# Patient Record
Sex: FEMALE | Race: WHITE | Employment: FULL TIME | URBAN - METROPOLITAN AREA
[De-identification: names, ages, dates, MRNs, and addresses within clinical notes are randomized per-mention and may not be internally consistent; named-entity substitution may affect disease eponyms.]

---

## 2017-09-01 PROBLEM — Z34.93 NORMAL PREGNANCY IN THIRD TRIMESTER: Status: ACTIVE | Noted: 2017-09-01

## 2022-07-29 ENCOUNTER — APPOINTMENT (OUTPATIENT)
Dept: GENERAL RADIOLOGY | Age: 27
End: 2022-07-29
Payer: COMMERCIAL

## 2022-07-29 ENCOUNTER — HOSPITAL ENCOUNTER (EMERGENCY)
Age: 27
Discharge: HOME | End: 2022-07-29
Attending: EMERGENCY MEDICINE
Payer: COMMERCIAL

## 2022-07-29 VITALS
RESPIRATION RATE: 14 BRPM | SYSTOLIC BLOOD PRESSURE: 116 MMHG | WEIGHT: 145 LBS | DIASTOLIC BLOOD PRESSURE: 74 MMHG | HEIGHT: 66 IN | OXYGEN SATURATION: 100 % | HEART RATE: 75 BPM | TEMPERATURE: 98.1 F | BODY MASS INDEX: 23.3 KG/M2

## 2022-07-29 DIAGNOSIS — M25.561 ACUTE PAIN OF RIGHT KNEE: Primary | ICD-10-CM

## 2022-07-29 PROCEDURE — 73560 X-RAY EXAM OF KNEE 1 OR 2: CPT

## 2022-07-29 PROCEDURE — 99283 EMERGENCY DEPT VISIT LOW MDM: CPT

## 2022-07-29 ASSESSMENT — PAIN DESCRIPTION - PAIN TYPE: TYPE: ACUTE PAIN

## 2022-07-29 ASSESSMENT — PAIN DESCRIPTION - DESCRIPTORS: DESCRIPTORS: ACHING

## 2022-07-29 ASSESSMENT — PAIN DESCRIPTION - LOCATION: LOCATION: KNEE

## 2022-07-29 ASSESSMENT — PAIN DESCRIPTION - ORIENTATION: ORIENTATION: RIGHT

## 2022-07-29 ASSESSMENT — PAIN DESCRIPTION - FREQUENCY: FREQUENCY: CONTINUOUS

## 2022-07-29 ASSESSMENT — PAIN SCALES - GENERAL: PAINLEVEL_OUTOF10: 4

## 2022-07-29 ASSESSMENT — PAIN - FUNCTIONAL ASSESSMENT
PAIN_FUNCTIONAL_ASSESSMENT: NONE - DENIES PAIN
PAIN_FUNCTIONAL_ASSESSMENT: 0-10

## 2022-07-29 NOTE — ED NOTES
Patient given prescription, work note, discharge instructions verbal and written, patient verbalized understanding. Alert/oriented X4, Clear speech. Patient exhibits no distress, ambulates with steady gait per self leaving unit, no further request. Right knee ace wrap in place.      Sosa Black RN  07/29/22 7596

## 2022-07-29 NOTE — ED PROVIDER NOTES
extremities to command. LABS and DIAGNOSTIC RESULTS      RADIOLOGY  X-RAYS:  I have reviewed radiologic plain film image(s). ALL OTHER NON-PLAIN FILM IMAGES SUCH AS CT, ULTRASOUND AND MRI HAVE BEEN READ BY THE RADIOLOGIST. XR KNEE RIGHT (1-2 VIEWS)   Final Result      No acute bony pathology           LABS  Labs Reviewed - No data to display    Rakesh          I advised the patient to return to the emergency department immediately for any new or worsening symptoms, such as fever, migrating joint swelling, severe pain. The patient voiced agreement and understanding of the treatment plan. Patient was given scripts for the following medications. I counseled patient how to take these medications. Discharge Medication List as of 7/29/2022  6:34 PM        START taking these medications    Details   diclofenac sodium (VOLTAREN) 1 % GEL Apply 4 g topically in the morning and 4 g at noon and 4 g in the evening and 4 g before bedtime. , Topical, 4 TIMES DAILY Starting Fri 7/29/2022, Disp-100 g, R-0, Normal             Disposition  Pt is in good condition upon Discharge to home. Clinical Impression  1.  Acute pain of right knee           Dr. Farida Palmer, PGY3    Patient seen in collaboration with attending physician Dr. Michael Costa, DO  Resident  07/29/22 6711

## 2022-08-11 ENCOUNTER — HOSPITAL ENCOUNTER (EMERGENCY)
Age: 27
Discharge: HOME | End: 2022-08-11
Attending: STUDENT IN AN ORGANIZED HEALTH CARE EDUCATION/TRAINING PROGRAM
Payer: COMMERCIAL

## 2022-08-11 ENCOUNTER — APPOINTMENT (OUTPATIENT)
Dept: GENERAL RADIOLOGY | Age: 27
End: 2022-08-11
Payer: COMMERCIAL

## 2022-08-11 VITALS
WEIGHT: 146.4 LBS | HEIGHT: 66 IN | SYSTOLIC BLOOD PRESSURE: 114 MMHG | OXYGEN SATURATION: 95 % | TEMPERATURE: 98.5 F | BODY MASS INDEX: 23.53 KG/M2 | DIASTOLIC BLOOD PRESSURE: 72 MMHG | RESPIRATION RATE: 12 BRPM | HEART RATE: 75 BPM

## 2022-08-11 DIAGNOSIS — S62.325A CLOSED DISPLACED FRACTURE OF SHAFT OF FOURTH METACARPAL BONE OF LEFT HAND, INITIAL ENCOUNTER: Primary | ICD-10-CM

## 2022-08-11 PROCEDURE — 6370000000 HC RX 637 (ALT 250 FOR IP): Performed by: STUDENT IN AN ORGANIZED HEALTH CARE EDUCATION/TRAINING PROGRAM

## 2022-08-11 PROCEDURE — 99283 EMERGENCY DEPT VISIT LOW MDM: CPT

## 2022-08-11 PROCEDURE — 73130 X-RAY EXAM OF HAND: CPT

## 2022-08-11 PROCEDURE — 29125 APPL SHORT ARM SPLINT STATIC: CPT

## 2022-08-11 RX ORDER — IBUPROFEN 600 MG/1
600 TABLET ORAL 4 TIMES DAILY PRN
Qty: 40 TABLET | Refills: 0 | Status: SHIPPED | OUTPATIENT
Start: 2022-08-11

## 2022-08-11 RX ORDER — NAPROXEN 500 MG/1
500 TABLET ORAL 2 TIMES DAILY WITH MEALS
Status: DISCONTINUED | OUTPATIENT
Start: 2022-08-12 | End: 2022-08-12 | Stop reason: HOSPADM

## 2022-08-11 RX ORDER — ACETAMINOPHEN 500 MG
1000 TABLET ORAL ONCE
Status: COMPLETED | OUTPATIENT
Start: 2022-08-11 | End: 2022-08-11

## 2022-08-11 RX ORDER — ACETAMINOPHEN 500 MG
1000 TABLET ORAL 3 TIMES DAILY PRN
Qty: 180 TABLET | Refills: 0 | Status: SHIPPED | OUTPATIENT
Start: 2022-08-11

## 2022-08-11 RX ADMIN — ACETAMINOPHEN 1000 MG: 500 TABLET ORAL at 22:00

## 2022-08-11 RX ADMIN — Medication 500 MG: at 22:00

## 2022-08-11 ASSESSMENT — ENCOUNTER SYMPTOMS
DIARRHEA: 0
COUGH: 0
SHORTNESS OF BREATH: 0
VOMITING: 0
BACK PAIN: 0
NAUSEA: 0
SORE THROAT: 0
ABDOMINAL PAIN: 0
EYE PAIN: 0

## 2022-08-11 ASSESSMENT — PAIN DESCRIPTION - PAIN TYPE: TYPE: ACUTE PAIN

## 2022-08-11 ASSESSMENT — PAIN DESCRIPTION - DESCRIPTORS
DESCRIPTORS: ACHING
DESCRIPTORS: ACHING

## 2022-08-11 ASSESSMENT — PAIN DESCRIPTION - FREQUENCY: FREQUENCY: CONTINUOUS

## 2022-08-11 ASSESSMENT — PAIN DESCRIPTION - LOCATION: LOCATION: HAND;FINGER (COMMENT WHICH ONE)

## 2022-08-11 ASSESSMENT — PAIN - FUNCTIONAL ASSESSMENT: PAIN_FUNCTIONAL_ASSESSMENT: 0-10

## 2022-08-11 ASSESSMENT — PAIN SCALES - GENERAL
PAINLEVEL_OUTOF10: 8
PAINLEVEL_OUTOF10: 7

## 2022-08-11 ASSESSMENT — PAIN DESCRIPTION - ORIENTATION: ORIENTATION: LEFT

## 2022-08-11 NOTE — Clinical Note
Alicia Funk was seen and treated in our emergency department on 8/11/2022. She may return to work on 08/15/2022. If you have any questions or concerns, please don't hesitate to call.       Megan Lock MD

## 2022-08-12 NOTE — ED PROVIDER NOTES
[address]      Pt Name: Cassia Mccollum  MRN: [id number]  [place] 1995  Date of evaluation: 8/11/2022  Provider: Mikey Snyder MD    CHIEF COMPLAINT       Chief Complaint   Patient presents with    Hand Injury     Left hand pain. HISTORY OF PRESENT ILLNESS   (Location/Symptom, Timing/Onset,Context/Setting, Quality, Duration, Modifying Factors, Severity)  Note limiting factors. Cassia Mccollum is a 32 y.o. female who presents to the ED with a chief complaint of left hand pain after she jumped into a pool just prior to arrival.  Onset immediately after jumping in the pool, that she struck her head against something next to the pool. Pain localized over the metacarpal bones of each finger excluding the thumb on the left hand. Is able to range each finger completely but does have pain across the metacarpal phalangeal joints of the second and third digits. Denies sensory loss, focal weakness. No open wounds. No head trauma, no LOC, no neck pain. Symptoms not otherwise alleviated or exacerbated by other factors      NursingNotes were reviewed. REVIEW OF SYSTEMS    (2-9 systems for level 4, 10 or more for level 5)     Review of Systems   Constitutional:  Negative for chills and fever. HENT:  Negative for congestion and sore throat. Eyes:  Negative for pain and visual disturbance. Respiratory:  Negative for cough and shortness of breath. Cardiovascular:  Negative for chest pain and palpitations. Gastrointestinal:  Negative for abdominal pain, diarrhea, nausea and vomiting. Genitourinary:  Negative for dysuria and frequency. Musculoskeletal:  Positive for arthralgias and myalgias. Negative for back pain and neck pain. Left hand pain as in HPI. Skin:  Negative for rash and wound. Neurological:  Negative for dizziness, weakness and light-headedness.       PAST MEDICAL HISTORY     Past Medical History:   Diagnosis Date    Asthma Bipolar 1 disorder (HCC)     Heart abnormality     Pneumonia     Postpartum depression          SURGICALHISTORY       Past Surgical History:   Procedure Laterality Date    WISDOM TOOTH EXTRACTION  2015         CURRENT MEDICATIONS       Previous Medications    DICLOFENAC SODIUM (VOLTAREN) 1 % GEL    Apply 4 g topically in the morning and 4 g at noon and 4 g in the evening and 4 g before bedtime. IBUPROFEN (ADVIL;MOTRIN) 800 MG TABLET    Take 1 tablet by mouth every 8 hours    PRENATAL VIT-FE FUMARATE-FA (PRENATAL VITAMIN PO)    Take by mouth       ALLERGIES     Patient has no known allergies.     FAMILY HISTORY       Family History   Problem Relation Age of Onset    Depression Mother     Heart Disease Mother     High Blood Pressure Mother     Substance Abuse Mother     Mental Illness Father     Heart Disease Father     High Blood Pressure Father     Substance Abuse Father     Learning Disabilities Sister     Cancer Paternal Uncle     Heart Disease Maternal Grandmother     High Blood Pressure Maternal Grandmother     Heart Disease Maternal Grandfather     High Blood Pressure Maternal Grandfather     Cancer Paternal Grandmother     Heart Disease Paternal Grandmother     High Blood Pressure Paternal Grandmother     Heart Disease Paternal Grandfather     High Blood Pressure Paternal Grandfather           SOCIAL HISTORY       Social History     Socioeconomic History    Marital status: Single    Number of children: 2   Tobacco Use    Smoking status: Former     Packs/day: 1.00     Years: 4.00     Pack years: 4.00     Types: Cigarettes    Smokeless tobacco: Never   Vaping Use    Vaping Use: Never used   Substance and Sexual Activity    Alcohol use: Yes     Comment: social    Drug use: Not Currently     Types: Marijuana [place])     Comment: last smoked 3 weeks ago 12/20/15    Sexual activity: Yes     Partners: Male       SCREENINGS    [place] Coma Scale  Eye Opening: Spontaneous  Best Verbal Response: Oriented  Best Motor Response: Obeys commands  [place] Coma Scale Score: 15        PHYSICAL EXAM    (up to 7 for level 4, 8 or more for level 5)     ED Triage Vitals [08/11/22 2118]   BP Temp Temp Source Heart Rate Resp SpO2 Height Weight   114/72 98.5 °F (36.9 °C) Oral 75 12 95 % 5' 6\" (1.676 m) 146 lb 6.4 oz (66.4 kg)       General: Alert and oriented appropriately for age, No acute distress. Eye: Normal conjunctiva. Sclera anicteric. HENT: Oral mucosa is moist.  Respiratory: Respirations even and non-labored. Cardiovascular: Normal rate, Regular rhythm. Radial pulse 2+ equal to contralateral extremity. Gastrointestinal: Soft, Non-tender, Non-distended. : deferred. Physical Exam  Musculoskeletal:      Left elbow: Normal.      Left forearm: Normal.      Left wrist: Normal. No swelling, deformity, lacerations, bony tenderness or snuff box tenderness. Normal range of motion. Left hand: Swelling and bony tenderness (Second third fourth and fifth metacarpals) present. No deformity or lacerations. Decreased range of motion (Across the metacarpal joints of the second third and fourth fingers). Normal strength. Normal sensation. There is no disruption of two-point discrimination. Normal capillary refill. Normal pulse. Integumentary: Warm, Dry. Neurologic: Alert and appropriate for age. No focal deficits. Psychiatric: Cooperative. DIAGNOSTIC RESULTS         RADIOLOGY:   Non-plain filmimages such as CT, Ultrasound and MRI are read by the radiologist. Plain radiographic images are visualized and preliminarily interpreted by the emergency physician with the below findings:      Interpretation per the Radiologist below, if available at the time ofthis note:    XR HAND LEFT (MIN 3 VIEWS)   Final Result   1. Obliquely oriented mildly displaced fourth metacarpal fracture without significant angulation.             EMERGENCY DEPARTMENT COURSE and DIFFERENTIAL DIAGNOSIS/MDM:   Vitals:    Vitals:    08/11/22 2118   BP: 114/72 Pulse: 75   Resp: 12   Temp: 98.5 °F (36.9 °C)   TempSrc: Oral   SpO2: 95%   Weight: 146 lb 6.4 oz (66.4 kg)   Height: 5' 6\" (1.676 m)         Medical decision makin-year-old female who jumped into a pool just prior to arrival, has tenderness to palpation over the metacarpal bones of the left hand, concerning for potential boxer's fracture. Denies assault, feels safe at home. Neurovascular intact distally. Closed injury. Denies IVDU. Hemodynamically stable and afebrile. Multimodal pain control, left hand x-ray. Medications   naproxen (NAPROSYN) tablet 500 mg    acetaminophen (TYLENOL) tablet 1,000 mg      X-rays obtained reveal acute displaced fracture of the left fourth metacarpal fracture. Ulnar gutter splint applied. Remains neurovascularly intact after splinting. Given outpatient follow-up with orthopedics, return precautions, voiced understanding. Rx multimodal pain control. Rest ice elevation. Discharged home, ambulated steadily from the emergency department upon discharge. FINAL IMPRESSION      1.  Closed displaced fracture of shaft of fourth metacarpal bone of left hand, initial encounter          DISPOSITION/PLAN   DISPOSITION Decision To Discharge 2022 10:32:42 PM      PATIENT REFERRED TO:  [address] Emergency Department  [address] [facility] [address]    If symptoms worsen    Kalpana Ortega MD  [address]: [facility]  [phone]    On 8/15/2022      [facility]) Pre-Services  [phone]          DISCHARGE MEDICATIONS:  New Prescriptions    ACETAMINOPHEN (TYLENOL) 500 MG TABLET    Take 2 tablets by mouth 3 times daily as needed for Pain    IBUPROFEN (ADVIL;MOTRIN) 600 MG TABLET    Take 1 tablet by mouth 4 times daily as needed for Pain          (Please note that portions of this note were completed with a voice recognition program.Efforts were made to edit the dictations but occasionally words are mis-transcribed.)    Kermit Healy MD (electronically signed)  Attending Emergency Physician          Kermit Healy MD  08/11/22 9844

## 2022-08-12 NOTE — ED NOTES
NVS intact R hand s/p splint placement. Patient given d/c instructions with return verbalization including Rx. Emphasis on f/u with ortho, to return with worsening s/s. All questions answered. Pt  Ambulated to lobby with steady gait.      Jhony Schofield RN  08/12/22 0807

## 2025-06-14 ENCOUNTER — APPOINTMENT (OUTPATIENT)
Dept: GENERAL RADIOLOGY | Age: 30
End: 2025-06-14
Payer: COMMERCIAL

## 2025-06-14 ENCOUNTER — HOSPITAL ENCOUNTER (EMERGENCY)
Age: 30
Discharge: HOME | End: 2025-06-14
Attending: EMERGENCY MEDICINE
Payer: COMMERCIAL

## 2025-06-14 VITALS
RESPIRATION RATE: 18 BRPM | OXYGEN SATURATION: 97 % | HEIGHT: 66 IN | BODY MASS INDEX: 25.65 KG/M2 | HEART RATE: 92 BPM | WEIGHT: 159.6 LBS | DIASTOLIC BLOOD PRESSURE: 91 MMHG | TEMPERATURE: 98.1 F | SYSTOLIC BLOOD PRESSURE: 132 MMHG

## 2025-06-14 DIAGNOSIS — J45.21 MILD INTERMITTENT ASTHMA WITH EXACERBATION: Primary | ICD-10-CM

## 2025-06-14 PROCEDURE — 99283 EMERGENCY DEPT VISIT LOW MDM: CPT

## 2025-06-14 PROCEDURE — 71046 X-RAY EXAM CHEST 2 VIEWS: CPT

## 2025-06-14 PROCEDURE — 6370000000 HC RX 637 (ALT 250 FOR IP): Performed by: EMERGENCY MEDICINE

## 2025-06-14 PROCEDURE — 6360000002 HC RX W HCPCS: Performed by: EMERGENCY MEDICINE

## 2025-06-14 PROCEDURE — 94640 AIRWAY INHALATION TREATMENT: CPT

## 2025-06-14 RX ORDER — PREDNISONE 20 MG/1
40 TABLET ORAL DAILY
Qty: 8 TABLET | Refills: 0 | Status: SHIPPED | OUTPATIENT
Start: 2025-06-15 | End: 2025-06-19

## 2025-06-14 RX ORDER — ALBUTEROL SULFATE 90 UG/1
2 INHALANT RESPIRATORY (INHALATION) 4 TIMES DAILY PRN
Qty: 54 G | Refills: 1 | Status: SHIPPED | OUTPATIENT
Start: 2025-06-14

## 2025-06-14 RX ORDER — ALBUTEROL SULFATE 0.83 MG/ML
2.5 SOLUTION RESPIRATORY (INHALATION) EVERY 6 HOURS PRN
Status: DISCONTINUED | OUTPATIENT
Start: 2025-06-14 | End: 2025-06-15 | Stop reason: HOSPADM

## 2025-06-14 RX ORDER — PREDNISONE 20 MG/1
40 TABLET ORAL ONCE
Status: COMPLETED | OUTPATIENT
Start: 2025-06-14 | End: 2025-06-14

## 2025-06-14 RX ADMIN — PREDNISONE 40 MG: 20 TABLET ORAL at 21:26

## 2025-06-14 RX ADMIN — ALBUTEROL SULFATE 2.5 MG: 2.5 SOLUTION RESPIRATORY (INHALATION) at 21:29

## 2025-06-15 NOTE — ED PROVIDER NOTES
[facility]  EMERGENCY DEPARTMENT ENCOUNTER          ATTENDING PHYSICIAN NOTE       Date of evaluation: 2025    Chief Complaint     Asthma (Sts asthma has \"been acting up\" the last few days. Coughing up white/green phlegm. No audible wheezing noted. Able to talk in complete sentences. )      History of Present Illness     Felicia Dallas is a 29 y.o. female with a pmh of bipolar disorder and asthma who presents today with what she describes as irritation in her asthma acting up over the past month.  She endorses wheezing occasional pain with coughing.  She states she been out of her albuterol inhalers and does not have a PCP.  Check she brought a friend in for a fall and noted that since she was here she thought she should get worked up and try to get some medication.  She endorses a mild cough with intermittent wheezing and has not  albuterol inhaler at home.  She denies any chest pain associate with this no exertional component of pain.  No significant pleuritic pain.    ASSESSMENT / PLAN  (MEDICAL DECISION MAKING)     INITIAL VITALS: BP: (!) 132/91, Temp: 98.1 °F (36.7 °C), Pulse: 92, Respirations: 20, SpO2: 97 %      Felicia Dallas is a 29 y.o. female with a mild exacerbation of asthma.  She is speaking in full sentences saturation 97% with some mild wheezing in the left lower.  Here chest x-ray showed new onset some pneumonia.  She was given a nebulizer and felt significant improvement.  At this point I am appears consistent with an asthma exacerbation.  She has no tachycardia or concern for PE.  She has wheezing which is resolved on exam and improvement in symptoms with albuterol treatment.  Will start prednisone as well for 5-day course    Medical Decision Making  Amount and/or Complexity of Data Reviewed  Radiology: ordered.    Risk  Prescription drug management.        Is this patient to be included in the SEP-1 core measure? No Exclusion criteria - the patient is NOT to be included for

## 2025-06-15 NOTE — DISCHARGE INSTRUCTIONS
Please return if you have increasing shortness of breath.  Using albuterol inhaler as needed and take prednisone as indicated.

## 2025-07-28 RX ORDER — ETONOGESTREL 68 MG/1
68 IMPLANT SUBCUTANEOUS ONCE
COMMUNITY